# Patient Record
Sex: FEMALE | URBAN - METROPOLITAN AREA
[De-identification: names, ages, dates, MRNs, and addresses within clinical notes are randomized per-mention and may not be internally consistent; named-entity substitution may affect disease eponyms.]

---

## 2018-03-10 ENCOUNTER — TELEPHONE (OUTPATIENT)
Dept: OTHER | Facility: CLINIC | Age: 23
End: 2018-03-10

## 2018-03-10 NOTE — TELEPHONE ENCOUNTER
Kaitlynn Barnes enrolled as a member of the [facility].      If your patient has a specific chronic illness or acute diagnosis which may be positively influenced by our medical fitness facility and expertise, please route a telephone encounter or staff message to:     [address] [place] [address] Drive